# Patient Record
Sex: FEMALE | Race: WHITE | NOT HISPANIC OR LATINO | Employment: OTHER
[De-identification: names, ages, dates, MRNs, and addresses within clinical notes are randomized per-mention and may not be internally consistent; named-entity substitution may affect disease eponyms.]

---

## 2018-05-14 ENCOUNTER — RECORDS - HEALTHEAST (OUTPATIENT)
Dept: ADMINISTRATIVE | Facility: OTHER | Age: 63
End: 2018-05-14

## 2019-11-06 ENCOUNTER — RECORDS - HEALTHEAST (OUTPATIENT)
Dept: ADMINISTRATIVE | Facility: OTHER | Age: 64
End: 2019-11-06

## 2020-07-23 ENCOUNTER — HOME CARE/HOSPICE - HEALTHEAST (OUTPATIENT)
Dept: HOME HEALTH SERVICES | Facility: HOME HEALTH | Age: 65
End: 2020-07-23

## 2020-07-27 ENCOUNTER — HOME CARE/HOSPICE - HEALTHEAST (OUTPATIENT)
Dept: HOME HEALTH SERVICES | Facility: HOME HEALTH | Age: 65
End: 2020-07-27

## 2020-07-30 ENCOUNTER — HOME CARE/HOSPICE - HEALTHEAST (OUTPATIENT)
Dept: HOME HEALTH SERVICES | Facility: HOME HEALTH | Age: 65
End: 2020-07-30

## 2020-07-31 ENCOUNTER — HOME CARE/HOSPICE - HEALTHEAST (OUTPATIENT)
Dept: HOME HEALTH SERVICES | Facility: HOME HEALTH | Age: 65
End: 2020-07-31

## 2020-08-03 ENCOUNTER — HOME CARE/HOSPICE - HEALTHEAST (OUTPATIENT)
Dept: HOME HEALTH SERVICES | Facility: HOME HEALTH | Age: 65
End: 2020-08-03

## 2020-08-05 ENCOUNTER — HOME CARE/HOSPICE - HEALTHEAST (OUTPATIENT)
Dept: HOME HEALTH SERVICES | Facility: HOME HEALTH | Age: 65
End: 2020-08-05

## 2020-08-10 ENCOUNTER — HOME CARE/HOSPICE - HEALTHEAST (OUTPATIENT)
Dept: HOME HEALTH SERVICES | Facility: HOME HEALTH | Age: 65
End: 2020-08-10

## 2020-08-12 ENCOUNTER — HOME CARE/HOSPICE - HEALTHEAST (OUTPATIENT)
Dept: HOME HEALTH SERVICES | Facility: HOME HEALTH | Age: 65
End: 2020-08-12

## 2020-08-17 ENCOUNTER — HOME CARE/HOSPICE - HEALTHEAST (OUTPATIENT)
Dept: HOME HEALTH SERVICES | Facility: HOME HEALTH | Age: 65
End: 2020-08-17

## 2021-05-26 ENCOUNTER — RECORDS - HEALTHEAST (OUTPATIENT)
Dept: ADMINISTRATIVE | Facility: CLINIC | Age: 66
End: 2021-05-26

## 2021-05-27 VITALS — SYSTOLIC BLOOD PRESSURE: 148 MMHG | TEMPERATURE: 96.1 F | HEART RATE: 115 BPM | DIASTOLIC BLOOD PRESSURE: 77 MMHG

## 2021-05-27 VITALS
OXYGEN SATURATION: 97 % | DIASTOLIC BLOOD PRESSURE: 73 MMHG | TEMPERATURE: 98.4 F | HEART RATE: 116 BPM | SYSTOLIC BLOOD PRESSURE: 124 MMHG

## 2021-05-27 VITALS — OXYGEN SATURATION: 96 % | HEART RATE: 118 BPM | SYSTOLIC BLOOD PRESSURE: 117 MMHG | DIASTOLIC BLOOD PRESSURE: 67 MMHG

## 2021-05-27 VITALS — HEART RATE: 119 BPM | SYSTOLIC BLOOD PRESSURE: 135 MMHG | DIASTOLIC BLOOD PRESSURE: 75 MMHG

## 2021-05-28 ENCOUNTER — RECORDS - HEALTHEAST (OUTPATIENT)
Dept: ADMINISTRATIVE | Facility: CLINIC | Age: 66
End: 2021-05-28

## 2021-05-29 ENCOUNTER — RECORDS - HEALTHEAST (OUTPATIENT)
Dept: ADMINISTRATIVE | Facility: CLINIC | Age: 66
End: 2021-05-29

## 2021-05-31 ENCOUNTER — RECORDS - HEALTHEAST (OUTPATIENT)
Dept: ADMINISTRATIVE | Facility: CLINIC | Age: 66
End: 2021-05-31

## 2021-06-16 PROBLEM — R73.9 HYPERGLYCEMIA: Status: ACTIVE | Noted: 2020-07-21

## 2021-07-16 ENCOUNTER — TRANSFERRED RECORDS (OUTPATIENT)
Dept: HEALTH INFORMATION MANAGEMENT | Facility: CLINIC | Age: 66
End: 2021-07-16

## 2022-07-07 ENCOUNTER — TRANSFERRED RECORDS (OUTPATIENT)
Dept: HEALTH INFORMATION MANAGEMENT | Facility: CLINIC | Age: 67
End: 2022-07-07

## 2022-07-07 ENCOUNTER — LAB REQUISITION (OUTPATIENT)
Dept: LAB | Facility: CLINIC | Age: 67
End: 2022-07-07
Payer: COMMERCIAL

## 2022-07-07 DIAGNOSIS — L08.9 LOCAL INFECTION OF THE SKIN AND SUBCUTANEOUS TISSUE, UNSPECIFIED: ICD-10-CM

## 2022-07-07 LAB
GRAM STAIN RESULT: NORMAL
GRAM STAIN RESULT: NORMAL

## 2022-07-07 PROCEDURE — 87070 CULTURE OTHR SPECIMN AEROBIC: CPT | Mod: ORL | Performed by: ORTHOPAEDIC SURGERY

## 2022-07-07 PROCEDURE — 87205 SMEAR GRAM STAIN: CPT | Mod: ORL | Performed by: ORTHOPAEDIC SURGERY

## 2022-07-07 PROCEDURE — 87075 CULTR BACTERIA EXCEPT BLOOD: CPT | Mod: ORL | Performed by: ORTHOPAEDIC SURGERY

## 2022-07-12 LAB — BACTERIA TISS BX CULT: NO GROWTH

## 2022-07-14 LAB — BACTERIA TISS BX CULT: NORMAL

## 2022-07-15 ENCOUNTER — TRANSFERRED RECORDS (OUTPATIENT)
Dept: HEALTH INFORMATION MANAGEMENT | Facility: CLINIC | Age: 67
End: 2022-07-15

## 2022-07-25 ENCOUNTER — TRANSFERRED RECORDS (OUTPATIENT)
Dept: HEALTH INFORMATION MANAGEMENT | Facility: CLINIC | Age: 67
End: 2022-07-25

## 2022-08-08 ENCOUNTER — TRANSFERRED RECORDS (OUTPATIENT)
Dept: HEALTH INFORMATION MANAGEMENT | Facility: CLINIC | Age: 67
End: 2022-08-08

## 2022-09-01 ENCOUNTER — TRANSFERRED RECORDS (OUTPATIENT)
Dept: HEALTH INFORMATION MANAGEMENT | Facility: CLINIC | Age: 67
End: 2022-09-01

## 2023-05-16 ENCOUNTER — TRANSFERRED RECORDS (OUTPATIENT)
Dept: HEALTH INFORMATION MANAGEMENT | Facility: CLINIC | Age: 68
End: 2023-05-16

## 2023-05-17 ENCOUNTER — TRANSFERRED RECORDS (OUTPATIENT)
Dept: HEALTH INFORMATION MANAGEMENT | Facility: CLINIC | Age: 68
End: 2023-05-17

## 2024-06-07 ENCOUNTER — TELEPHONE (OUTPATIENT)
Dept: OPHTHALMOLOGY | Facility: CLINIC | Age: 69
End: 2024-06-07
Payer: COMMERCIAL

## 2024-06-07 ENCOUNTER — MEDICAL CORRESPONDENCE (OUTPATIENT)
Dept: HEALTH INFORMATION MANAGEMENT | Facility: CLINIC | Age: 69
End: 2024-06-07
Payer: COMMERCIAL

## 2024-06-07 NOTE — TELEPHONE ENCOUNTER
This encounter is being sent to inform the clinic that this [facility] the [facility] and has requested that this patient be seen within STAT ORDER for pt to be seen within 3-5 days and/or with Neuro OP.  Based on the availability of our provider(s), we are unable to accommodate this request.    Were all sites offered this patient?  Yes    Does scheduling algorithm request to schedule next available?  Patient has been scheduled for the first available opening with Dr Madden  on 7/9/24.  We have informed the patient that the clinic will review their referral and reach out if a sooner appointment is medically necessary.

## 2024-06-10 ENCOUNTER — TRANSFERRED RECORDS (OUTPATIENT)
Dept: HEALTH INFORMATION MANAGEMENT | Facility: CLINIC | Age: 69
End: 2024-06-10
Payer: COMMERCIAL

## 2024-06-10 LAB — RETINOPATHY: POSITIVE

## 2024-06-11 ENCOUNTER — TRANSCRIBE ORDERS (OUTPATIENT)
Dept: OTHER | Age: 69
End: 2024-06-11

## 2024-06-11 DIAGNOSIS — H47.10 OPTIC NERVE EDEMA: Primary | ICD-10-CM

## 2024-06-12 NOTE — TELEPHONE ENCOUNTER
This pt does not want to come to  for her appointment     I was looking on google to provide her a clinic elsewhere and I was providing the clinic names and locations - Suad started to yell very loudly - I asked her to not to yell -     Provided the clinic address and time /date of appointment, but she does not want to come to [facility]     I did advise Suad to call her referring to place a referral elsewhere     Gloria Page Communication Facilitator on 6/12/2024 at 4:25 PM

## 2024-06-19 ENCOUNTER — LAB (OUTPATIENT)
Dept: LAB | Facility: CLINIC | Age: 69
End: 2024-06-19
Payer: COMMERCIAL

## 2024-06-19 DIAGNOSIS — H47.019 ISCHEMIC OPTIC NEUROPATHY, UNSPECIFIED LATERALITY: Primary | ICD-10-CM

## 2024-06-19 LAB
CRP SERPL-MCNC: 3.04 MG/L
ERYTHROCYTE [SEDIMENTATION RATE] IN BLOOD BY WESTERGREN METHOD: 17 MM/HR (ref 0–30)

## 2024-06-19 PROCEDURE — 86140 C-REACTIVE PROTEIN: CPT

## 2024-06-19 PROCEDURE — 36415 COLL VENOUS BLD VENIPUNCTURE: CPT

## 2024-06-19 PROCEDURE — 85652 RBC SED RATE AUTOMATED: CPT

## 2025-04-10 ENCOUNTER — TRANSFERRED RECORDS (OUTPATIENT)
Dept: HEALTH INFORMATION MANAGEMENT | Facility: CLINIC | Age: 70
End: 2025-04-10
Payer: COMMERCIAL

## 2025-06-02 ENCOUNTER — TRANSFERRED RECORDS (OUTPATIENT)
Dept: HEALTH INFORMATION MANAGEMENT | Facility: CLINIC | Age: 70
End: 2025-06-02
Payer: COMMERCIAL